# Patient Record
Sex: FEMALE | Race: WHITE
[De-identification: names, ages, dates, MRNs, and addresses within clinical notes are randomized per-mention and may not be internally consistent; named-entity substitution may affect disease eponyms.]

---

## 2017-01-24 ENCOUNTER — HOSPITAL ENCOUNTER (EMERGENCY)
Dept: HOSPITAL 35 - ER | Age: 52
Discharge: HOME | End: 2017-01-24
Payer: COMMERCIAL

## 2017-01-24 VITALS — BODY MASS INDEX: 34.81 KG/M2 | WEIGHT: 235.01 LBS | HEIGHT: 69 IN

## 2017-01-24 VITALS — DIASTOLIC BLOOD PRESSURE: 89 MMHG | SYSTOLIC BLOOD PRESSURE: 146 MMHG

## 2017-01-24 DIAGNOSIS — S80.01XA: ICD-10-CM

## 2017-01-24 DIAGNOSIS — R07.89: Primary | ICD-10-CM
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
## 2017-01-24 DIAGNOSIS — Z91.041: ICD-10-CM

## 2017-01-24 DIAGNOSIS — S39.012A: ICD-10-CM

## 2017-01-24 DIAGNOSIS — I95.1: ICD-10-CM
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
## 2017-01-24 DIAGNOSIS — Z88.1: ICD-10-CM

## 2017-01-24 DIAGNOSIS — Z91.013: ICD-10-CM
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
## 2017-01-24 DIAGNOSIS — Z88.8: ICD-10-CM
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
## 2017-01-24 DIAGNOSIS — Z90.710: ICD-10-CM

## 2017-01-24 LAB
ALBUMIN SERPL-MCNC: 3.7 G/DL (ref 3.4–5)
ALP SERPL-CCNC: 81 U/L (ref 46–116)
ALT SERPL-CCNC: 28 U/L (ref 30–65)
ANION GAP SERPL CALC-SCNC: 9 MMOL/L (ref 7–16)
AST SERPL-CCNC: 12 U/L (ref 15–37)
BASOPHILS NFR BLD AUTO: 0.3 % (ref 0–2)
BILIRUB SERPL-MCNC: 0.5 MG/DL
BUN SERPL-MCNC: 10 MG/DL (ref 7–18)
CALCIUM SERPL-MCNC: 8.9 MG/DL (ref 8.5–10.1)
CHLORIDE SERPL-SCNC: 105 MMOL/L (ref 98–107)
CO2 SERPL-SCNC: 26 MMOL/L (ref 21–32)
CREAT SERPL-MCNC: 0.9 MG/DL (ref 0.6–1.3)
EOSINOPHIL NFR BLD: 0.4 % (ref 0–3)
ERYTHROCYTE [DISTWIDTH] IN BLOOD BY AUTOMATED COUNT: 16.8 % (ref 10.5–14.5)
GLUCOSE SERPL-MCNC: 99 MG/DL (ref 70–99)
GRANULOCYTES NFR BLD MANUAL: 80.3 % (ref 36–66)
HCT VFR BLD CALC: 30.1 % (ref 37–47)
HGB BLD-MCNC: 9.6 GM/DL (ref 12–15)
LYMPHOCYTES NFR BLD AUTO: 13.3 % (ref 24–44)
MANUAL DIFFERENTIAL PERFORMED BLD QL: NO
MCH RBC QN AUTO: 26.2 PG (ref 26–34)
MCHC RBC AUTO-ENTMCNC: 32 % (ref 28–37)
MCV RBC: 81.9 FL (ref 80–100)
MONOCYTES NFR BLD: 5.7 % (ref 1–8)
NEUTROPHILS # BLD: 7.2 THOU/UL (ref 1.4–8.2)
PLATELET # BLD: 263 THOU/UL (ref 150–400)
POTASSIUM SERPL-SCNC: 3 MMOL/L (ref 3.5–5.1)
PROT SERPL-MCNC: 7.5 G/DL (ref 6.4–8.2)
RBC # BLD AUTO: 3.68 MIL/UL (ref 4.2–5)
SODIUM SERPL-SCNC: 140 MMOL/L (ref 136–145)
TROPONIN I SERPL-MCNC: < 0.04 NG/ML
WBC # BLD AUTO: 9 THOU/UL (ref 4–11)

## 2019-04-16 ENCOUNTER — HOSPITAL ENCOUNTER (OUTPATIENT)
Dept: HOSPITAL 35 - GI | Age: 54
Discharge: HOME | End: 2019-04-16
Attending: INTERNAL MEDICINE
Payer: COMMERCIAL

## 2019-04-16 VITALS — WEIGHT: 232.01 LBS | HEIGHT: 70 IN | BODY MASS INDEX: 33.22 KG/M2

## 2019-04-16 DIAGNOSIS — M79.7: ICD-10-CM

## 2019-04-16 DIAGNOSIS — K52.9: Primary | ICD-10-CM

## 2019-04-16 DIAGNOSIS — K20.9: ICD-10-CM

## 2019-04-16 DIAGNOSIS — K21.9: ICD-10-CM

## 2019-04-16 DIAGNOSIS — G47.33: ICD-10-CM

## 2019-04-16 DIAGNOSIS — F32.9: ICD-10-CM

## 2019-04-16 DIAGNOSIS — Z98.890: ICD-10-CM

## 2019-04-16 DIAGNOSIS — K44.9: ICD-10-CM

## 2019-04-16 DIAGNOSIS — E66.09: ICD-10-CM

## 2019-04-16 DIAGNOSIS — K64.8: ICD-10-CM

## 2019-04-16 DIAGNOSIS — Z90.710: ICD-10-CM

## 2019-04-16 DIAGNOSIS — G43.909: ICD-10-CM

## 2019-04-16 DIAGNOSIS — Z86.73: ICD-10-CM

## 2019-04-16 DIAGNOSIS — J45.909: ICD-10-CM

## 2019-04-16 DIAGNOSIS — G62.9: ICD-10-CM

## 2019-04-16 DIAGNOSIS — Z91.041: ICD-10-CM

## 2019-04-16 DIAGNOSIS — K29.50: ICD-10-CM

## 2019-04-16 DIAGNOSIS — Z95.0: ICD-10-CM

## 2019-04-16 DIAGNOSIS — I10: ICD-10-CM

## 2019-04-16 DIAGNOSIS — Z79.899: ICD-10-CM

## 2019-04-16 PROCEDURE — 62110: CPT

## 2019-04-16 PROCEDURE — 62900: CPT

## 2019-06-18 ENCOUNTER — HOSPITAL ENCOUNTER (OUTPATIENT)
Dept: HOSPITAL 35 - CV | Age: 54
End: 2019-06-18
Attending: INTERNAL MEDICINE
Payer: COMMERCIAL

## 2019-06-18 DIAGNOSIS — Z95.0: ICD-10-CM

## 2019-06-18 DIAGNOSIS — R55: Primary | ICD-10-CM

## 2019-06-24 ENCOUNTER — HOSPITAL ENCOUNTER (EMERGENCY)
Dept: HOSPITAL 35 - ER | Age: 54
Discharge: HOME | End: 2019-06-24
Payer: COMMERCIAL

## 2019-06-24 VITALS — SYSTOLIC BLOOD PRESSURE: 181 MMHG | DIASTOLIC BLOOD PRESSURE: 105 MMHG

## 2019-06-24 VITALS — BODY MASS INDEX: 31.5 KG/M2 | HEIGHT: 71 IN | WEIGHT: 225 LBS

## 2019-06-24 DIAGNOSIS — K21.9: ICD-10-CM

## 2019-06-24 DIAGNOSIS — I10: ICD-10-CM

## 2019-06-24 DIAGNOSIS — J45.909: ICD-10-CM

## 2019-06-24 DIAGNOSIS — Z90.710: ICD-10-CM

## 2019-06-24 DIAGNOSIS — R07.89: Primary | ICD-10-CM

## 2019-06-24 DIAGNOSIS — Z91.013: ICD-10-CM

## 2019-06-24 DIAGNOSIS — Z88.2: ICD-10-CM

## 2019-06-24 DIAGNOSIS — K58.9: ICD-10-CM

## 2019-06-24 DIAGNOSIS — Z91.041: ICD-10-CM

## 2019-06-24 DIAGNOSIS — M79.7: ICD-10-CM

## 2019-06-24 DIAGNOSIS — Z88.8: ICD-10-CM

## 2019-06-24 DIAGNOSIS — G43.909: ICD-10-CM

## 2019-06-24 DIAGNOSIS — Z88.1: ICD-10-CM

## 2019-06-24 DIAGNOSIS — G47.30: ICD-10-CM

## 2019-06-24 LAB
ANION GAP SERPL CALC-SCNC: 7 MMOL/L (ref 7–16)
BUN SERPL-MCNC: 10 MG/DL (ref 7–18)
CALCIUM SERPL-MCNC: 9 MG/DL (ref 8.5–10.1)
CHLORIDE SERPL-SCNC: 107 MMOL/L (ref 98–107)
CO2 SERPL-SCNC: 31 MMOL/L (ref 21–32)
CREAT SERPL-MCNC: 0.8 MG/DL (ref 0.6–1)
ERYTHROCYTE [DISTWIDTH] IN BLOOD BY AUTOMATED COUNT: 15.4 % (ref 10.5–14.5)
GLUCOSE SERPL-MCNC: 102 MG/DL (ref 74–106)
HCT VFR BLD CALC: 33.8 % (ref 37–47)
HGB BLD-MCNC: 10.8 GM/DL (ref 12–15)
MCH RBC QN AUTO: 26.9 PG (ref 26–34)
MCHC RBC AUTO-ENTMCNC: 32 G/DL (ref 28–37)
MCV RBC: 84.1 FL (ref 80–100)
PLATELET # BLD: 265 THOU/UL (ref 150–400)
POTASSIUM SERPL-SCNC: 3.8 MMOL/L (ref 3.5–5.1)
RBC # BLD AUTO: 4.01 MIL/UL (ref 4.2–5)
SODIUM SERPL-SCNC: 145 MMOL/L (ref 136–145)
TROPONIN I SERPL-MCNC: <0.06 NG/ML (ref ?–0.06)
WBC # BLD AUTO: 6.5 THOU/UL (ref 4–11)

## 2019-07-12 ENCOUNTER — HOSPITAL ENCOUNTER (OUTPATIENT)
Dept: HOSPITAL 35 - NUC | Age: 54
End: 2019-07-12
Attending: INTERNAL MEDICINE
Payer: COMMERCIAL

## 2019-07-12 DIAGNOSIS — E66.9: ICD-10-CM

## 2019-07-12 DIAGNOSIS — Z95.0: ICD-10-CM

## 2019-07-12 DIAGNOSIS — I49.5: Primary | ICD-10-CM

## 2019-07-12 DIAGNOSIS — I50.9: ICD-10-CM

## 2019-07-12 DIAGNOSIS — Z88.8: ICD-10-CM

## 2019-07-12 DIAGNOSIS — R00.0: ICD-10-CM

## 2019-07-12 DIAGNOSIS — Z79.899: ICD-10-CM

## 2019-07-12 DIAGNOSIS — I11.0: ICD-10-CM

## 2019-08-02 ENCOUNTER — HOSPITAL ENCOUNTER (OUTPATIENT)
Dept: HOSPITAL 35 - CAT | Age: 54
End: 2019-08-02
Attending: INTERNAL MEDICINE
Payer: COMMERCIAL
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
## 2019-08-02 DIAGNOSIS — M16.11: Primary | ICD-10-CM

## 2019-08-02 DIAGNOSIS — Z88.8: ICD-10-CM

## 2019-09-09 ENCOUNTER — HOSPITAL ENCOUNTER (OUTPATIENT)
Dept: HOSPITAL 35 - RAD | Age: 54
Discharge: HOME | End: 2019-09-09
Attending: ORTHOPAEDIC SURGERY
Payer: COMMERCIAL

## 2019-09-09 DIAGNOSIS — M25.511: Primary | ICD-10-CM
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
## 2019-09-09 DIAGNOSIS — Z88.8: ICD-10-CM

## 2019-09-09 DIAGNOSIS — Z79.899: ICD-10-CM

## 2019-09-09 DIAGNOSIS — S43.431A: ICD-10-CM

## 2019-09-09 DIAGNOSIS — S42.251A: ICD-10-CM
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
## 2019-09-09 DIAGNOSIS — Z98.890: ICD-10-CM
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
## 2020-03-17 ENCOUNTER — HOSPITAL ENCOUNTER (OUTPATIENT)
Dept: HOSPITAL 35 - SJCVC | Age: 55
End: 2020-03-17
Payer: COMMERCIAL

## 2020-03-17 DIAGNOSIS — R00.0: ICD-10-CM

## 2020-03-17 DIAGNOSIS — I95.1: ICD-10-CM

## 2020-03-17 DIAGNOSIS — I10: Primary | ICD-10-CM

## 2020-03-17 DIAGNOSIS — E78.5: ICD-10-CM

## 2020-03-17 DIAGNOSIS — Z95.0: ICD-10-CM

## 2020-06-20 ENCOUNTER — HOSPITAL ENCOUNTER (EMERGENCY)
Dept: HOSPITAL 35 - ER | Age: 55
LOS: 1 days | Discharge: HOME | End: 2020-06-21
Payer: COMMERCIAL

## 2020-06-20 VITALS — BODY MASS INDEX: 32.58 KG/M2 | HEIGHT: 69 IN | WEIGHT: 220 LBS

## 2020-06-20 DIAGNOSIS — Z88.2: ICD-10-CM
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
## 2020-06-20 DIAGNOSIS — Z91.041: ICD-10-CM

## 2020-06-20 DIAGNOSIS — N39.0: ICD-10-CM
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
## 2020-06-20 DIAGNOSIS — Z88.1: ICD-10-CM

## 2020-06-20 DIAGNOSIS — Z91.013: ICD-10-CM

## 2020-06-20 DIAGNOSIS — G43.909: ICD-10-CM

## 2020-06-20 DIAGNOSIS — Z88.8: ICD-10-CM

## 2020-06-20 DIAGNOSIS — G44.209: ICD-10-CM

## 2020-06-20 DIAGNOSIS — Z90.710: ICD-10-CM

## 2020-06-20 DIAGNOSIS — Z79.899: ICD-10-CM

## 2020-06-20 DIAGNOSIS — S16.1XXA: Primary | ICD-10-CM

## 2020-06-20 DIAGNOSIS — I10: ICD-10-CM
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
## 2020-06-20 DIAGNOSIS — J45.909: ICD-10-CM

## 2020-06-20 LAB
ALBUMIN SERPL-MCNC: 2.9 G/DL (ref 3.4–5)
ALT SERPL-CCNC: 28 U/L (ref 30–65)
ANION GAP SERPL CALC-SCNC: 8 MMOL/L (ref 7–16)
AST SERPL-CCNC: 43 U/L (ref 15–37)
BACTERIA-REFLEX: (no result) /HPF
BASOPHILS NFR BLD AUTO: 0.3 % (ref 0–2)
BENZODIAZ UR-MCNC: POSITIVE UG/L
BILIRUB DIRECT SERPL-MCNC: < 0.1 MG/DL
BILIRUB SERPL-MCNC: 0.4 MG/DL (ref 0.2–1)
BILIRUB UR-MCNC: NEGATIVE MG/DL
BUN SERPL-MCNC: 7 MG/DL (ref 7–18)
CALCIUM SERPL-MCNC: 8.4 MG/DL (ref 8.5–10.1)
CHLORIDE SERPL-SCNC: 104 MMOL/L (ref 98–107)
CO2 SERPL-SCNC: 28 MMOL/L (ref 21–32)
COLOR UR: YELLOW
CREAT SERPL-MCNC: 1 MG/DL (ref 0.6–1)
EOSINOPHIL NFR BLD: 2 % (ref 0–3)
ERYTHROCYTE [DISTWIDTH] IN BLOOD BY AUTOMATED COUNT: 15.7 % (ref 10.5–14.5)
GLUCOSE SERPL-MCNC: 89 MG/DL (ref 74–106)
GRANULOCYTES NFR BLD MANUAL: 79.7 % (ref 36–66)
HCT VFR BLD CALC: 30.5 % (ref 37–47)
HGB BLD-MCNC: 10 GM/DL (ref 12–15)
KETONES UR STRIP-MCNC: NEGATIVE MG/DL
LYMPHOCYTES NFR BLD AUTO: 11.7 % (ref 24–44)
MCH RBC QN AUTO: 27.1 PG (ref 26–34)
MCHC RBC AUTO-ENTMCNC: 32.6 G/DL (ref 28–37)
MCV RBC: 83.2 FL (ref 80–100)
MONOCYTES NFR BLD: 6.3 % (ref 1–8)
MUCUS: (no result) STRN/LPF
NEUTROPHILS # BLD: 5.5 THOU/UL (ref 1.4–8.2)
OPIATES UR-MCNC: POSITIVE NG/ML
PLATELET # BLD: 220 THOU/UL (ref 150–400)
POTASSIUM SERPL-SCNC: 3.3 MMOL/L (ref 3.5–5.1)
PROT SERPL-MCNC: 6.1 G/DL (ref 6.4–8.2)
RBC # BLD AUTO: 3.67 MIL/UL (ref 4.2–5)
RBC # UR STRIP: (no result) /UL
RBC #/AREA URNS HPF: (no result) /HPF (ref 0–2)
SODIUM SERPL-SCNC: 140 MMOL/L (ref 136–145)
SP GR UR STRIP: 1.02 (ref 1–1.03)
SQUAMOUS: (no result) /LPF (ref 0–3)
URINE CLARITY: CLEAR
URINE GLUCOSE-RANDOM*: NEGATIVE
URINE LEUKOCYTES-REFLEX: (no result)
URINE NITRITE-REFLEX: NEGATIVE
URINE PROTEIN (DIPSTICK): NEGATIVE
URINE WBC-REFLEX: (no result) /HPF (ref 0–5)
UROBILINOGEN UR STRIP-ACNC: 0.2 E.U./DL (ref 0.2–1)
WBC # BLD AUTO: 6.9 THOU/UL (ref 4–11)

## 2020-06-21 VITALS — DIASTOLIC BLOOD PRESSURE: 83 MMHG | SYSTOLIC BLOOD PRESSURE: 160 MMHG

## 2020-06-21 NOTE — EKG
[facility]
[place]   [address]                     ELECTROCARDIOGRAM REPORT      
_______________________________________________________________________________
 
Name:       OMER MOHAN           Room #:                     DEP [facility].#:      [id number]                       Account #:      [id number]  
Admission:  20    Attend Phys:                          
Discharge:  20    Date of Birth:  [birth year]  
                                                          Report #: [id number]
                                                                    [id number]
_______________________________________________________________________________
THIS REPORT FOR:  
 
cc:  Abhi Hunter MD, Mark S. MD Couchonnal, Luis F. MD                                            ~
THIS REPORT FOR:   //name//                          
 
                         [facility] ED
                                       
Test Date:    2020               Test Time:    21:45:38
Pat Name:     OMER MOHAN            Department:   
Patient ID:   [id number]            Room:          
Gender:       F                        Technician:   [facility]
:          1965               Requested By: Ynes Khanna
Order Number: [id number] MD:   Anand Gil
                                 Measurements
Intervals                              Axis          
Rate:         70                       P:            
RI:           167                      QRS:          42
QRSD:         103                      T:            25
QT:           450                                    
QTc:          486                                    
                           Interpretive Statements
Atrial-paced complexes
RSR' in V1 or V2, right VCD or RVH
Compared to ECG 2019 10:29:27
 
Electronically Signed On 2020 11:02:02 CDT by Anand Gil
[email or web address]
 
 
 
 
 
 
 
 
 
 
 
 
 
 
 
  <ELECTRONICALLY SIGNED>
   By: Anand Gil MD        
  20     1102
D: 205                           _____________________________________
T: 205                           Anand Gil MD          /EPI

## 2021-03-10 ENCOUNTER — HOSPITAL ENCOUNTER (EMERGENCY)
Dept: HOSPITAL 35 - ER | Age: 56
Discharge: HOME | End: 2021-03-10
Payer: COMMERCIAL

## 2021-03-10 VITALS — DIASTOLIC BLOOD PRESSURE: 97 MMHG | SYSTOLIC BLOOD PRESSURE: 154 MMHG

## 2021-03-10 VITALS — BODY MASS INDEX: 34.07 KG/M2 | WEIGHT: 230.01 LBS | HEIGHT: 69 IN

## 2021-03-10 DIAGNOSIS — Z88.2: ICD-10-CM

## 2021-03-10 DIAGNOSIS — N39.0: Primary | ICD-10-CM

## 2021-03-10 DIAGNOSIS — I10: ICD-10-CM

## 2021-03-10 DIAGNOSIS — Z90.710: ICD-10-CM

## 2021-03-10 DIAGNOSIS — Z88.8: ICD-10-CM

## 2021-03-10 DIAGNOSIS — G43.909: ICD-10-CM

## 2021-03-10 DIAGNOSIS — R30.0: ICD-10-CM

## 2021-03-10 DIAGNOSIS — Z88.1: ICD-10-CM

## 2021-03-10 DIAGNOSIS — Z91.041: ICD-10-CM

## 2021-03-10 DIAGNOSIS — K21.9: ICD-10-CM

## 2021-03-10 DIAGNOSIS — Z79.899: ICD-10-CM

## 2021-03-10 DIAGNOSIS — J45.909: ICD-10-CM

## 2021-03-10 LAB
ALBUMIN SERPL-MCNC: 3.9 G/DL (ref 3.4–5)
ALT SERPL-CCNC: 98 U/L (ref 14–59)
ANION GAP SERPL CALC-SCNC: 10 MMOL/L (ref 7–16)
AST SERPL-CCNC: 350 U/L (ref 15–37)
BASOPHILS NFR BLD AUTO: 0.2 % (ref 0–2)
BILIRUB SERPL-MCNC: 0.6 MG/DL (ref 0.2–1)
BUN SERPL-MCNC: 12 MG/DL (ref 7–18)
CALCIUM SERPL-MCNC: 8.5 MG/DL (ref 8.5–10.1)
CHLORIDE SERPL-SCNC: 103 MMOL/L (ref 98–107)
CO2 SERPL-SCNC: 28 MMOL/L (ref 21–32)
COLOR UR: (no result)
CREAT SERPL-MCNC: 1.1 MG/DL (ref 0.6–1)
EOSINOPHIL NFR BLD: 0.4 % (ref 0–3)
ERYTHROCYTE [DISTWIDTH] IN BLOOD BY AUTOMATED COUNT: 16.2 % (ref 10.5–14.5)
GLUCOSE SERPL-MCNC: 104 MG/DL (ref 74–106)
GRANULOCYTES NFR BLD MANUAL: 81.7 % (ref 36–66)
HCT VFR BLD CALC: 31.6 % (ref 37–47)
HGB BLD-MCNC: 10.2 GM/DL (ref 12–15)
KETONES UR STRIP-MCNC: (no result) MG/DL
LYMPHOCYTES NFR BLD AUTO: 9.9 % (ref 24–44)
MCH RBC QN AUTO: 27.3 PG (ref 26–34)
MCHC RBC AUTO-ENTMCNC: 32.3 G/DL (ref 28–37)
MCV RBC: 84.4 FL (ref 80–100)
MONOCYTES NFR BLD: 7.8 % (ref 1–8)
NEUTROPHILS # BLD: 4.5 THOU/UL (ref 1.4–8.2)
PLATELET # BLD: 252 THOU/UL (ref 150–400)
POTASSIUM SERPL-SCNC: 3.5 MMOL/L (ref 3.5–5.1)
PROT SERPL-MCNC: 7.8 G/DL (ref 6.4–8.2)
RBC # BLD AUTO: 3.75 MIL/UL (ref 4.2–5)
RBC # UR STRIP: (no result) /UL
RBC #/AREA URNS HPF: (no result) /HPF (ref 0–2)
SODIUM SERPL-SCNC: 141 MMOL/L (ref 136–145)
SP GR UR STRIP: >= 1.03 (ref 1–1.03)
SQUAMOUS: (no result) /LPF (ref 0–3)
URINE CLARITY: CLEAR
URINE GLUCOSE-RANDOM*: (no result)
URINE LEUKOCYTES-REFLEX: (no result)
URINE NITRITE-REFLEX: POSITIVE
URINE PROTEIN (DIPSTICK): (no result)
URINE WBC-REFLEX: (no result) /HPF (ref 0–5)
UROBILINOGEN UR STRIP-ACNC: >= 8 E.U./DL (ref 0.2–1)
WBC # BLD AUTO: 5.5 THOU/UL (ref 4–11)

## 2021-03-12 NOTE — EKG
[facility]
[place]  [address]
Phone:  [phone]                    ELECTROCARDIOGRAM REPORT      
_______________________________________________________________________________
 
Name:       OMER MOHAN           [id number]     Account #:      [id number]  
Admission:  03/10/21    Attend Phys:                          
Discharge:  03/10/21    Date of Birth:  [birth year]  
                                                          Report #: [id number]
   [id number]
_______________________________________________________________________________
                         [facility] ED
                                       
Test Date:    2021-03-10               Test Time:    16:50:56
Pat Name:     OMER MOHAN            Department:   
Patient ID:   [id number]            Room:          
Gender:       F                        Technician:   SANA
:          1965               Requested By: Aminata Bah
Order Number: [id number] MD:   [place] Jaime
                                 Measurements
Intervals                              Axis          
Rate:         105                      P:            55
NH:           181                      QRS:          66
QRSD:         89                       T:            5
QT:           353                                    
QTc:          467                                    
                           Interpretive Statements
Sinus tachycardia
Probable left atrial enlargement
Nonspecific T abnormalities, anterior leads
Compared to ECG 2020 21:45:38
T-wave abnormality now present
Atrial-paced complex(es) or rhythm no longer present
Right ventricular hypertrophy no longer present
Electronically Signed On 3- 6:57:47 CST by Cleveland Jaime
[email or web address]
 
 
 
 
 
 
 
 
 
 
 
 
 
 
 
 
 
 
  <ELECTRONICALLY SIGNED>
   By: Cleveland Jaime MD, FAC    
  21     0657
D: 03/10/21 1650                           _____________________________________
T: 03/10/21 1650                           Cleveland Jaime MD, [facility]      /EPI

## 2021-03-24 ENCOUNTER — HOSPITAL ENCOUNTER (EMERGENCY)
Dept: HOSPITAL 35 - ER | Age: 56
Discharge: HOME | End: 2021-03-24
Payer: COMMERCIAL

## 2021-03-24 VITALS — DIASTOLIC BLOOD PRESSURE: 96 MMHG | SYSTOLIC BLOOD PRESSURE: 164 MMHG

## 2021-03-24 VITALS — BODY MASS INDEX: 34.07 KG/M2 | HEIGHT: 69 IN | WEIGHT: 230.01 LBS

## 2021-03-24 DIAGNOSIS — N39.0: Primary | ICD-10-CM

## 2021-03-24 DIAGNOSIS — Z90.710: ICD-10-CM

## 2021-03-24 DIAGNOSIS — Z79.899: ICD-10-CM

## 2021-03-24 DIAGNOSIS — I10: ICD-10-CM

## 2021-03-24 DIAGNOSIS — Z88.1: ICD-10-CM

## 2021-03-24 DIAGNOSIS — Z91.041: ICD-10-CM

## 2021-03-24 DIAGNOSIS — Z88.2: ICD-10-CM

## 2021-03-24 DIAGNOSIS — R47.1: ICD-10-CM

## 2021-03-24 DIAGNOSIS — J45.909: ICD-10-CM

## 2021-03-24 DIAGNOSIS — K21.9: ICD-10-CM

## 2021-03-24 DIAGNOSIS — Z91.013: ICD-10-CM

## 2021-03-24 DIAGNOSIS — G43.909: ICD-10-CM

## 2021-03-24 DIAGNOSIS — Z88.8: ICD-10-CM

## 2021-03-24 LAB
ALBUMIN SERPL-MCNC: 4 G/DL (ref 3.4–5)
ALT SERPL-CCNC: 39 U/L (ref 14–59)
ANION GAP SERPL CALC-SCNC: 12 MMOL/L (ref 7–16)
AST SERPL-CCNC: 26 U/L (ref 15–37)
BASOPHILS NFR BLD AUTO: 0.2 % (ref 0–2)
BILIRUB SERPL-MCNC: 0.4 MG/DL (ref 0.2–1)
BILIRUB UR-MCNC: NEGATIVE MG/DL
BUN SERPL-MCNC: 14 MG/DL (ref 7–18)
CALCIUM SERPL-MCNC: 9 MG/DL (ref 8.5–10.1)
CHLORIDE SERPL-SCNC: 105 MMOL/L (ref 98–107)
CO2 SERPL-SCNC: 26 MMOL/L (ref 21–32)
COLOR UR: YELLOW
CREAT SERPL-MCNC: 1 MG/DL (ref 0.6–1)
EOSINOPHIL NFR BLD: 0.4 % (ref 0–3)
ERYTHROCYTE [DISTWIDTH] IN BLOOD BY AUTOMATED COUNT: 16.5 % (ref 10.5–14.5)
GLUCOSE SERPL-MCNC: 116 MG/DL (ref 74–106)
GRANULOCYTES NFR BLD MANUAL: 91.7 % (ref 36–66)
HCT VFR BLD CALC: 33.8 % (ref 37–47)
HGB BLD-MCNC: 10.8 GM/DL (ref 12–15)
KETONES UR STRIP-MCNC: NEGATIVE MG/DL
LYMPHOCYTES NFR BLD AUTO: 5.5 % (ref 24–44)
MCH RBC QN AUTO: 27.2 PG (ref 26–34)
MCHC RBC AUTO-ENTMCNC: 31.9 G/DL (ref 28–37)
MCV RBC: 85.3 FL (ref 80–100)
MONOCYTES NFR BLD: 2.2 % (ref 1–8)
NEUTROPHILS # BLD: 8.4 THOU/UL (ref 1.4–8.2)
PLATELET # BLD: 268 THOU/UL (ref 150–400)
POTASSIUM SERPL-SCNC: 3.8 MMOL/L (ref 3.5–5.1)
PROT SERPL-MCNC: 7.8 G/DL (ref 6.4–8.2)
RBC # BLD AUTO: 3.97 MIL/UL (ref 4.2–5)
RBC # UR STRIP: (no result) /UL
RBC #/AREA URNS HPF: (no result) /HPF (ref 0–2)
SODIUM SERPL-SCNC: 143 MMOL/L (ref 136–145)
SP GR UR STRIP: 1.01 (ref 1–1.03)
SQUAMOUS: (no result) /LPF (ref 0–3)
TROPONIN I SERPL-MCNC: <0.06 NG/ML (ref ?–0.06)
URINE CLARITY: CLEAR
URINE GLUCOSE-RANDOM*: NEGATIVE
URINE LEUKOCYTES-REFLEX: (no result)
URINE NITRITE-REFLEX: POSITIVE
URINE PROTEIN (DIPSTICK): NEGATIVE
URINE WBC-REFLEX: (no result) /HPF (ref 0–5)
UROBILINOGEN UR STRIP-ACNC: 1 E.U./DL (ref 0.2–1)
WBC # BLD AUTO: 9.7 THOU/UL (ref 4–11)

## 2021-03-25 NOTE — EKG
[place] [address] [facility] [address]
[place]  [address]
Phone:  [phone]                    ELECTROCARDIOGRAM REPORT      
_______________________________________________________________________________
 
Name:       OMER MOHAN           [facility]                     [id number]     Account #:      [id number]  
Admission:  21    Attend Phys:                          
Discharge:  21    Date of Birth:  [birth year]  
                                                          Report #: [id number]
   [id number]
_______________________________________________________________________________
                          [facility]
                                       
Test Date:    2021               Test Time:    17:24:41
Pat Name:     OMER MOHAN            Department:   
Patient ID:   [id number]            Room:          
Gender:       F                        Technician:   
:          1965               Requested By: Theo Sims
Order Number: [id number] MD:   Iglesia York
                                 Measurements
Intervals                              Axis          
Rate:         79                       P:            50
DC:           187                      QRS:          25
QRSD:         125                      T:            17
QT:           410                                    
QTc:          471                                    
                           Interpretive Statements
Sinus rhythm
Right ventricular conduction delay
Compared to ECG 03/10/2021 16:50:56
Sinus tachycardia no longer present
Electronically Signed On 3- 8:14:26 CDT by Iglesia York
[email or web address]
 
 
 
 
 
 
 
 
 
 
 
 
 
 
 
 
 
 
 
 
 
  <ELECTRONICALLY SIGNED>
   By: Iglesia York MD, [facility]   
  21     0814
D: 21 1724                           _____________________________________
T: 21 1724                           Iglesia York MD, FACC     /EPI